# Patient Record
Sex: MALE | Race: WHITE
[De-identification: names, ages, dates, MRNs, and addresses within clinical notes are randomized per-mention and may not be internally consistent; named-entity substitution may affect disease eponyms.]

---

## 2019-12-04 ENCOUNTER — HOSPITAL ENCOUNTER (OUTPATIENT)
Dept: HOSPITAL 76 - DI.S | Age: 69
Discharge: HOME | End: 2019-12-04
Attending: NURSE PRACTITIONER
Payer: MEDICARE

## 2019-12-04 DIAGNOSIS — M25.461: ICD-10-CM

## 2019-12-04 DIAGNOSIS — M19.011: ICD-10-CM

## 2019-12-04 DIAGNOSIS — M19.012: ICD-10-CM

## 2019-12-04 DIAGNOSIS — M25.861: Primary | ICD-10-CM

## 2019-12-05 NOTE — XRAY REPORT
Reason:  PAIN IN SHOULDER AND RIGHT KNEE

Procedure Date:  12/04/2019   

Accession Number:  [id number] / [id number]                    

Procedure:  XRS - Shoulder 3 View BILAT CPT Code:  

 

***Final Report***

 

 

FULL RESULT:

 

 

Bilateral Shoulder Radiography

 

EXAM DATE: 12/4/2019 09:04 AM.

 

CLINICAL HISTORY: Right shoulder pain.

 

COMPARISON: None.

 

TECHNIQUE: 3 views each.

 

FINDINGS:

Right:

Bones: Bones are osteopenic. No fractures or bone lesions.

 

Joints: Minor degenerative spurring is seen in the glenohumeral and 

acromioclavicular joints. Mild subchondral cystic changes present. Normal 

alignment.

 

Soft tissues: The visualized hemithorax is unremarkable. No soft tissue 

swelling.

 

Left:

Bones: Bones are osteopenic. No fractures or bone lesions.

 

Joints: Minor degenerative spurring is seen in the glenohumeral and 

acromioclavicular joints. Normal alignment.

 

Soft tissues: The visualized hemithorax is unremarkable. No soft tissue 

swelling.

IMPRESSION: Minor bilateral shoulder DJD. No acute findings.

 

RADIA

## 2019-12-05 NOTE — XRAY REPORT
Reason:  SHOULDER PAIN BILATERAL, PAIN IN RIGHT KNEE

Procedure Date:  12/04/2019   

Accession Number:  [id number] / [id number]                    

Procedure:  XRS - Knee 3 View RT CPT Code:  

 

***Final Report***

 

 

FULL RESULT:

 

 

EXAM:

RIGHT KNEE RADIOGRAPHY

 

EXAM DATE: 12/4/2019 09:03 AM.

 

CLINICAL HISTORY: SHOULDER PAIN BILATERAL, PAIN IN RIGHT KNEE.

 

COMPARISON: None.

 

TECHNIQUE: 3 views.

 

FINDINGS:

Bones: Normal. No fractures or bone lesions.

 

Joints: Moderate sized joint effusion. Amorphous calcifications in medial 

and lateral compartments.

 

Soft Tissues: Normal. No soft tissue swelling.

IMPRESSION:

1. Moderate-sized joint effusion.

2. Amorphous calcifications in the medial and lateral compartments 

consistent with chondrocalcinosis.

 

[facility]IA

## 2020-02-10 ENCOUNTER — HOSPITAL ENCOUNTER (OUTPATIENT)
Dept: HOSPITAL 76 - DI.S | Age: 70
Discharge: HOME | End: 2020-02-10
Attending: NURSE PRACTITIONER
Payer: MEDICARE

## 2020-02-10 DIAGNOSIS — R91.8: Primary | ICD-10-CM

## 2020-02-10 PROCEDURE — 71046 X-RAY EXAM CHEST 2 VIEWS: CPT

## 2020-02-11 NOTE — XRAY REPORT
Reason:  DYSPNEA, UNSPECIFIED

Procedure Date:  02/10/2020   

Accession Number:  [id number] / [id number]                    

Procedure:  XRS - Chest 2 View X-Ray CPT Code:  46039

 

***Final Report***

 

 

FULL RESULT:

 

 

EXAM:

CHEST RADIOGRAPHY

 

EXAM DATE: 2/10/2020 10:26 AM.

 

CLINICAL HISTORY: Dyspnea, unspecified.

 

COMPARISON: None.

 

TECHNIQUE: 2 views.

 

FINDINGS:

 

Lungs/Pleura: Hyperinflation with flattening of the diaphragm. Area of 

focal infiltrate in the medial right lung base. Bilateral alveolar 

opacification in the lower lung fields, nonspecific, but can be seen in 

pneumonia, including atypical pneumonia. Upper lungs appear clear.

 

Mediastinum: Heart and mediastinal contours are unremarkable.

 

Other: None.

IMPRESSION: Right lung base infiltrate and bibasilar alveolar 

opacification, as above.

 

RADIA

## 2020-10-05 ENCOUNTER — HOSPITAL ENCOUNTER (OUTPATIENT)
Dept: HOSPITAL 76 - DI.S | Age: 70
Discharge: HOME | End: 2020-10-05
Attending: NURSE PRACTITIONER
Payer: MEDICARE

## 2020-10-05 DIAGNOSIS — M25.562: Primary | ICD-10-CM

## 2020-10-05 NOTE — XRAY REPORT
PROCEDURE:  Knee 3 View LT

 

INDICATIONS:  PAIN IN LEFT KNEE

 

TECHNIQUE:  3 views of the left knee(s) were acquired.  

 

COMPARISON:  None.

 

FINDINGS:  

 

Bones:  No fractures or dislocations.  No suspicious bony lesions.  

 

Soft tissues:  No joint effusion.  No suspicious soft tissue calcifications are seen except within th
e medial border of the medial meniscus best appreciated on the frontal projection..  

 

IMPRESSION:  Meniscal calcifications of the medial compartment, no effusion or loose body seen. Mild 
narrowing of the medial compartment joint interspace is present, indicating mild osteoarthritis is th
e likely cause.

 

Reviewed by: Chandu Lambert MD on 10/5/2020 3:09 PM PDT

Approved by: Chandu Lambert MD on 10/5/2020 3:09 PM PDT

 

 

Station ID:  [id number]

## 2021-12-08 ENCOUNTER — HOSPITAL ENCOUNTER (EMERGENCY)
Dept: HOSPITAL 76 - ED | Age: 71
Discharge: HOME | End: 2021-12-08
Payer: MEDICARE

## 2021-12-08 VITALS — DIASTOLIC BLOOD PRESSURE: 71 MMHG | SYSTOLIC BLOOD PRESSURE: 140 MMHG

## 2021-12-08 DIAGNOSIS — N13.2: Primary | ICD-10-CM

## 2021-12-08 LAB
ALBUMIN DIAFP-MCNC: 4.8 G/DL (ref 3.2–5.5)
ALBUMIN/GLOB SERPL: 1.5 {RATIO} (ref 1–2.2)
ALP SERPL-CCNC: 58 IU/L (ref 42–121)
ALT SERPL W P-5'-P-CCNC: 20 IU/L (ref 10–60)
ANION GAP SERPL CALCULATED.4IONS-SCNC: 11 MMOL/L (ref 6–13)
AST SERPL W P-5'-P-CCNC: 24 IU/L (ref 10–42)
BASOPHILS NFR BLD AUTO: 0.1 10^3/UL (ref 0–0.1)
BASOPHILS NFR BLD AUTO: 0.3 %
BILIRUB BLD-MCNC: 2.3 MG/DL (ref 0.2–1)
BUN SERPL-MCNC: 29 MG/DL (ref 6–20)
CALCIUM UR-MCNC: 9.7 MG/DL (ref 8.5–10.3)
CHLORIDE SERPL-SCNC: 99 MMOL/L (ref 101–111)
CLARITY UR REFRACT.AUTO: (no result)
CO2 SERPL-SCNC: 26 MMOL/L (ref 21–32)
CREAT SERPLBLD-SCNC: 1.5 MG/DL (ref 0.6–1.2)
EOSINOPHIL # BLD AUTO: 0 10^3/UL (ref 0–0.7)
EOSINOPHIL NFR BLD AUTO: 0.1 %
ERYTHROCYTE [DISTWIDTH] IN BLOOD BY AUTOMATED COUNT: 12.7 % (ref 12–15)
GFRSERPLBLD MDRD-ARVRAT: 46 ML/MIN/{1.73_M2} (ref 89–?)
GLOBULIN SER-MCNC: 3.1 G/DL (ref 2.1–4.2)
GLUCOSE SERPL-MCNC: 124 MG/DL (ref 70–100)
GLUCOSE UR QL STRIP.AUTO: NEGATIVE MG/DL
HCT VFR BLD AUTO: 40.6 % (ref 42–52)
HGB UR QL STRIP: 14.2 G/DL (ref 14–18)
KETONES UR QL STRIP.AUTO: (no result) MG/DL
LIPASE SERPL-CCNC: 28 U/L (ref 22–51)
LYMPHOCYTES # SPEC AUTO: 1.7 10^3/UL (ref 1.5–3.5)
LYMPHOCYTES NFR BLD AUTO: 12 %
MCH RBC QN AUTO: 30.7 PG (ref 27–31)
MCHC RBC AUTO-ENTMCNC: 35 G/DL (ref 32–36)
MCV RBC AUTO: 87.9 FL (ref 80–94)
MONOCYTES # BLD AUTO: 0.9 10^3/UL (ref 0–1)
MONOCYTES NFR BLD AUTO: 6.5 %
NEUTROPHILS # BLD AUTO: 11.5 10^3/UL (ref 1.5–6.6)
NEUTROPHILS # SNV AUTO: 14.3 X10^3/UL (ref 4.8–10.8)
NEUTROPHILS NFR BLD AUTO: 80.4 %
NITRITE UR QL STRIP.AUTO: NEGATIVE
NRBC # BLD AUTO: 0 /100WBC
NRBC # BLD AUTO: 0 X10^3/UL
PDW BLD AUTO: 10.8 FL (ref 7.4–11.4)
PH UR STRIP.AUTO: 7.5 PH (ref 5–7.5)
PLATELET # BLD: 151 10^3/UL (ref 130–450)
POTASSIUM SERPL-SCNC: 3.8 MMOL/L (ref 3.5–5)
PROT SPEC-MCNC: 7.9 G/DL (ref 6.7–8.2)
PROT UR STRIP.AUTO-MCNC: (no result) MG/DL
RBC # UR STRIP.AUTO: (no result) /UL
RBC # URNS HPF: (no result) /HPF (ref 0–5)
RBC MAR: 4.62 10^6/UL (ref 4.7–6.1)
SODIUM SERPLBLD-SCNC: 136 MMOL/L (ref 135–145)
SP GR UR STRIP.AUTO: 1.02 (ref 1–1.03)
SQUAMOUS URNS QL MICRO: (no result)
UROBILINOGEN UR QL STRIP.AUTO: (no result) E.U./DL
UROBILINOGEN UR STRIP.AUTO-MCNC: NEGATIVE MG/DL
WBC # UR MANUAL: (no result) /HPF (ref 0–3)

## 2021-12-08 PROCEDURE — 36415 COLL VENOUS BLD VENIPUNCTURE: CPT

## 2021-12-08 PROCEDURE — 85025 COMPLETE CBC W/AUTO DIFF WBC: CPT

## 2021-12-08 PROCEDURE — 96375 TX/PRO/DX INJ NEW DRUG ADDON: CPT

## 2021-12-08 PROCEDURE — 99284 EMERGENCY DEPT VISIT MOD MDM: CPT

## 2021-12-08 PROCEDURE — 81001 URINALYSIS AUTO W/SCOPE: CPT

## 2021-12-08 PROCEDURE — 87086 URINE CULTURE/COLONY COUNT: CPT

## 2021-12-08 PROCEDURE — 81003 URINALYSIS AUTO W/O SCOPE: CPT

## 2021-12-08 PROCEDURE — 96374 THER/PROPH/DIAG INJ IV PUSH: CPT

## 2021-12-08 PROCEDURE — 83690 ASSAY OF LIPASE: CPT

## 2021-12-08 PROCEDURE — 80053 COMPREHEN METABOLIC PANEL: CPT

## 2021-12-08 PROCEDURE — 74177 CT ABD & PELVIS W/CONTRAST: CPT

## 2021-12-08 NOTE — CT REPORT
PROCEDURE:  Abdomen/Pelvis W

 

INDICATIONS:  Abdominal pain, acute, left flank/abd

 

CONTRAST:  IV CONTRAST: Optiray 320 ml: 100 PO CONTRAST: *NO PO CONTRAST 

 

TECHNIQUE:  

After the administration of intravenous contrast, 5 mm thick sections acquired from the diaphragms to
 the symphysis.  5 mm thick coronal and sagittal reformats were acquired.  For radiation dose reducti
on, the following was used:  automated exposure control, adjustment of mA and/or kV according to debora
ent size.  

 

COMPARISON:  None.

 

FINDINGS:  

Image quality:  Excellent.  

 

ABDOMEN:  

Lung bases: There is a small 2 to 3 mm subpleural nodule in the left lower lobe on series 4 image 7. 
A small region of indistinct round glass opacity within the medial right lower lobe on series 4 image
 6 may reflect a mild infectious or inflammatory process versus scarring. Heart size is normal.  

 

Solid organs:  Evaluation of the liver demonstrates no focal hepatic lesions. Gallbladder appears wit
hin normal limits without calcified gallstones. Biliary system is non dilated.  The spleen is normal 
in size. Pancreas enhances normally without peripancreatic fat stranding or fluid collections.  No ad
renal nodules.  

 

There is an obstructing stone measuring up to 7 mm within the proximal left ureter with associated mi
ld left hydroureteronephrosis. Mild perinephric and perirenal fat stranding also present. The mid to 
distal left ureter are nondistended. There are several hydronephrosis. Right ureter is nondistended.

 

Peritoneum and bowel:  Bowel loops demonstrate normal wall thickness and caliber. The appendix is nor
mal in appearance. There is colonic diverticulosis without acute diverticulitis. No free fluid or air
.  

 

Nodes and vessels:  No retroperitoneal or mesenteric adenopathy by size criteria.  Aorta and inferior
 vena cava are normal in size.  

 

Miscellaneous:  No ventral hernias.  

 

 

PELVIS:  

Genitourinary:  Bladder wall thickness is normal.  

 

Miscellaneous:  No inguinal hernias or adenopathy.  

 

Bones:  No suspicious bony lesions.  No vertebral body compression fractures.  

 

IMPRESSION:  

 

1. Obstructing proximal left ureteral stone with mild left hydroureteronephrosis.

 

2. Colonic diverticulosis without acute diverticulitis.

 

3. Small left lower lobe 2 to 3 mm pulmonary nodule. If patient is at high risk for malignancy, a fol
low-up CT may be performed in 12 months to demonstrate stability if clinically indicated.

 

Reviewed by: Oli Mike MD on 12/8/2021 8:17 AM [facility]

Approved by: Oli Mike MD on 12/8/2021 8:17 AM [facility]

 

 

Station ID:  [id number]

## 2021-12-08 NOTE — ED PHYSICIAN DOCUMENTATION
PD HPI ABD PAIN





- Stated complaint


Stated Complaint: LT SIDE PX





- Chief complaint


Chief Complaint: General





- History obtained from


History obtained from: Patient





- History of Present Illness


Timing - onset: How many days ago (2)


Timing - duration: Days (2)


Timing - details: Abrupt onset, Still present (has increased overnight, 

significantly painful now.)


Quality: Aching, Sharp, Pain


Location: LUQ


Radiation: Left flank


Improved by: No: Eating, Laying still, Meds (tried antacids. Did not try pain 

meds per se.)


Worsened by: No: Eating, Moving, Breathing


Associated symptoms: Nausea, Vomiting.  No: Fever, Diarrhea, Constipation (no BM

since Monday.)


Similar symptoms before: Has not had sx before


Recently seen: Not recently seen





Review of Systems


Constitutional: denies: Fever, Chills


Nose: denies: Rhinorrhea / runny nose, Congestion


Throat: denies: Sore throat


Respiratory: denies: Cough


GI: reports: Abdominal Pain (left side and flank), Nausea, Vomiting


: denies: Dysuria, Frequency


Skin: denies: Rash, Lesions


Musculoskeletal: reports: Back pain.  denies: Neck pain


Neurologic: denies: Generalized weakness, Near syncope





PD PAST MEDICAL HISTORY





- Past Medical History


Past Medical History: Yes


Cardiovascular: None


Respiratory: None


Neuro: None


Endocrine/Autoimmune: None


GI: None


: None


HEENT: None


Psych: None


Musculoskeletal: Gout


Derm: None





- Past Surgical History


Past Surgical History: No





- Present Medications


Home Medications: 


                                Ambulatory Orders











 Medication  Instructions  Recorded  Confirmed


 


Naproxen 500 mg PO BID 10 Days #20 tab 12/08/21 


 


Ondansetron Odt [Zofran] 4 mg TL Q6H PRN #10 tablet 12/08/21 


 


Tamsulosin [Flomax] 0.4 mg PO DAILY #5 cap 12/08/21 


 


oxyCODONE [Roxicodone] 5 mg PO Q4-6H PRN #20 tablet 12/08/21 














- Allergies


Allergies/Adverse Reactions: 


                                    Allergies











Allergy/AdvReac Type Severity Reaction Status Date / Time


 


No Known Drug Allergies Allergy   Verified 12/08/21 06:31














- Social History


Does the pt smoke?: No


Smoking Status: Never smoker


Does the pt drink ETOH?: Yes


Does the pt have substance abuse?: No





- Immunizations


Immunizations are current?: Yes





- POLST


Patient has POLST: No





PD ED PE NORMAL





- Vitals


Vital signs reviewed: Yes





- General


General: Alert and oriented X 3, Well developed/nourished, Other (appears in 

pain and moving around to try to get comfortable. )





- Neck


Neck: Supple, no meningeal sign, No adenopathy





- Cardiac


Cardiac: RRR, No murmur





- Respiratory


Respiratory: Clear bilaterally





- Abdomen


Abdomen: Normal bowel sounds, Soft, Non distended, No organomegaly, Other 

(slight tender left upper without guarding nor percussion tender. )





- Male 


Male : Deferred





- Rectal


Rectal: Deferred





- Back


Back: No spinal TTP, Other (significant left CVA tenderness to percussion. Not 

tender to light touch. No rash nor sores. )





- Derm


Derm: Normal color, Warm and dry, No rash





- Extremities


Extremities: No edema, No calf tenderness / cord





- Neuro


Neuro: Alert and oriented X 3, No motor deficit, Normal speech





Results





- Vitals


Vitals: 


                               Vital Signs - 24 hr











  12/08/21 12/08/21





  06:25 09:27


 


Temperature 36.9 C 37.2 C


 


Heart Rate 59 L 64


 


Respiratory 16 16





Rate  


 


Blood Pressure 165/70 H 140/71 H


 


O2 Saturation 100 98








                                     Oxygen











O2 Source                      Room air

















- Labs


Labs: 


                                Laboratory Tests











  12/08/21 12/08/21 12/08/21





  06:20 07:26 07:26


 


WBC   14.3 H 


 


RBC   4.62 L 


 


Hgb   14.2 


 


Hct   40.6 L 


 


MCV   87.9 


 


MCH   30.7 


 


MCHC   35.0 


 


RDW   12.7 


 


Plt Count   151 


 


MPV   10.8 


 


Neut # (Auto)   11.5 H 


 


Lymph # (Auto)   1.7 


 


Mono # (Auto)   0.9 


 


Eos # (Auto)   0.0 


 


Baso # (Auto)   0.1 


 


Absolute Nucleated RBC   0.00 


 


Nucleated RBC %   0.0 


 


Sodium    136


 


Potassium    3.8


 


Chloride    99 L


 


Carbon Dioxide    26


 


Anion Gap    11.0


 


BUN    29 H


 


Creatinine    1.5 H


 


Estimated GFR (MDRD)    46 L


 


Glucose    124 H


 


Calcium    9.7


 


Total Bilirubin    2.3 H


 


AST    24


 


ALT    20


 


Alkaline Phosphatase    58


 


Total Protein    7.9


 


Albumin    4.8


 


Globulin    3.1


 


Albumin/Globulin Ratio    1.5


 


Lipase    28


 


Urine Color  YELLOW  


 


Urine Clarity  CLOUDY  


 


Urine pH  7.5  


 


Ur Specific Gravity  1.020  


 


Urine Protein  TRACE  


 


Urine Glucose (UA)  NEGATIVE  


 


Urine Ketones  TRACE  


 


Urine Occult Blood  LARGE H  


 


Urine Nitrite  NEGATIVE  


 


Urine Bilirubin  NEGATIVE  


 


Urine Urobilinogen  0.2 (NORMAL)  


 


Ur Leukocyte Esterase  NEGATIVE  


 


Urine RBC  6-10 H  


 


Urine WBC  0-3  


 


Ur Squamous Epith Cells  RARE Squamous  


 


Amorphous Sediment  Marked  


 


Urine Bacteria  Rare  


 


Ur Microscopic Review  INDICATED  


 


Urine Culture Comments  NOT INDICATED  














- Rads (name of study)


  ** abd/pelvic CT


Radiology: Prelim report reviewed, EMP read contemporaneously (5x7 mm ureteral 

stone in proximal third of left ureteral with mild hydronephrosis.), See rad 

report





PD MEDICAL DECISION MAKING





- ED course


Complexity details: reviewed results, re-evaluated patient (much improved pain 

level with IV meds. ), considered differential (flank pain and some blood in 

urine. Consider kidney stone, but also consider diverticular, vascular such as 

aneurysm, splenic infarct.), d/w patient





Departure





- Departure


Disposition: 01 Home, Self Care


Clinical Impression: 


 Ureterolithiasis, Flank pain, acute





Condition: Stable


Record reviewed to determine appropriate education?: Yes


Instructions:  ED Stone Renal W Colic


Follow-Up: 


Gonsalo Patricio MD [Primary Care Provider] - 


Prescriptions: 


Tamsulosin [Flomax] 0.4 mg PO DAILY #5 cap


Naproxen 500 mg PO BID 10 Days #20 tab


oxyCODONE [Roxicodone] 5 mg PO Q4-6H PRN #20 tablet


 PRN Reason: Pain


Ondansetron Odt [Zofran] 4 mg TL Q6H PRN #10 tablet


 PRN Reason: Nausea / Vomiting


Comments: 





I transmitted your prescriptions to [facility] in [place].





Dehydrated but there is no reason to over hydrate per se.  You do have a 5 to 7 

mm stone in the left ureter (kidney stone).  This is of a passable size and the 

majority will pass over several days to week.





Follow-up with your primary care Dr. Patricio or [place] referral line for referral

 to urologist if you do not seem to be improving over the next several days to 

week.





We will try to keep the symptoms tolerable with anti-inflammatory naproxen twice

 daily with food.  Add Tylenol 650 mg 3 times a day regularly for the next 

several days as well.  To that add oxycodone every 4-6 hours if needed for worse

 pain.





Tamsulosin daily to reduce ureteral spasms and help promote passage of the stone

.





Recheck if not improved well over the next several days and return to the ER if 

symptoms are uncontrolled with the above medications.





My narcotic instructions I am prescribing a short course of narcotic pain 

medication for you.  These are potentially dangerous and addictive medications 

that should be used carefully.


These medications may constipate you.  Take an over-the-counter stool softener 

such as docusate twice daily with plenty of water while taking these 

medications.  If you go 24 hours without a bowel movement, take over-the-counter

 MiraLAX, per package instructions.


Do not drink or drive while taking these medications.


If you received narcotic or sedating medications while in the emergency 

department do not drive for 24 hours.  Store this medication in a safe, secure 

place and out of reach of children.


It is a violation of federal law to give or sell this medication to another 

person or to use in a manner other than prescribed.


The ED will not refill narcotic prescriptions, including prescriptions lost or 

stolen.  You can dispose of unwanted medications at the [facility]'s office 

or at several pharmacies such as [facility].


Discharge Date/Time: 12/08/21 10:19

## 2022-01-12 ENCOUNTER — HOSPITAL ENCOUNTER (OUTPATIENT)
Dept: HOSPITAL 76 - SDS | Age: 72
Discharge: HOME | End: 2022-01-12
Attending: SURGERY
Payer: MEDICARE

## 2022-01-12 VITALS — DIASTOLIC BLOOD PRESSURE: 60 MMHG | SYSTOLIC BLOOD PRESSURE: 122 MMHG

## 2022-01-12 DIAGNOSIS — Z12.11: Primary | ICD-10-CM

## 2022-01-12 DIAGNOSIS — K64.8: ICD-10-CM

## 2022-01-12 NOTE — ANESTHESIA
Pre-Anesthesia VS, & Labs





- Diagnosis





screening exam





- Procedure





colonoscopy


Vital Signs: 





                                        











Temp Pulse Resp BP Pulse Ox


 


 36.4 C L  60   22   143/68 H  99 


 


 01/12/22 08:44  01/12/22 08:44  01/12/22 08:44  01/12/22 08:44  01/12/22 08:44











Height: 5 ft 10 in


Weight (kg): 72 kg


Body Mass Index: 22.7


BMI Classification: Healthy weight





- NPO


>8 hours





Home Medications and Allergies





none


Allergies/Adverse Reactions: 


                                    Allergies











Allergy/AdvReac Type Severity Reaction Status Date / Time


 


No Known Drug Allergies Allergy   Verified 12/08/21 06:31














Anes History & Medical History





- Anesthetic History


Family history of Anesthesia Complications: Denies


Family history of Malignant Hyperthermia: Denies





- Medical History


Cardiovascular: reports: None


Pulmonary: reports: None, Other (snores)


Gastrointestinal: reports: None


Urinary: reports: Kidney stones


Neuro: reports: None


Musculoskeletal: reports: None


Endocrine/Autoimmune: reports: None


Blood Disorders: reports: None


Skin: reports: None


Smoking Status: Never smoker


Psychosocial: reports: Cannabis (CBD occassionally)


History of Cancer?: No





- Surgical History


General: reports: Colonoscopy





Exam


General: Alert, Oriented x3, Cooperative, No acute distress


Dental: WNL


Mouth Opening: 3 Fingerbreadth


Neck Mobility: Normal


Mallampati classification: III


Thyromental Distance: 4-6 cm


Mental/Cognitive Status: Alert/Oriented X3, Normal for patient





Plan


Anesthesia Type: General, Total IV


Consent for Procedure(s) Verified and Reviewed: Yes


Code Status: Attempt Resuscitation


ASA classification: 1-Healthy patient


Is this case an emergency?: No

## 2023-03-10 ENCOUNTER — HOSPITAL ENCOUNTER (OUTPATIENT)
Dept: HOSPITAL 76 - EMS | Age: 73
Discharge: ANOTHER HOSPITAL | End: 2023-03-10
Payer: MEDICARE

## 2023-03-10 DIAGNOSIS — R25.1: ICD-10-CM

## 2023-03-10 DIAGNOSIS — R11.2: ICD-10-CM

## 2023-03-10 DIAGNOSIS — M54.50: Primary | ICD-10-CM

## 2023-03-10 DIAGNOSIS — R10.32: ICD-10-CM

## 2023-03-10 DIAGNOSIS — R39.198: ICD-10-CM

## 2023-05-11 ENCOUNTER — HOSPITAL ENCOUNTER (OUTPATIENT)
Dept: HOSPITAL 76 - SC | Age: 73
Discharge: HOME | End: 2023-05-11
Attending: NURSE PRACTITIONER
Payer: MEDICARE

## 2023-05-11 VITALS — DIASTOLIC BLOOD PRESSURE: 64 MMHG | SYSTOLIC BLOOD PRESSURE: 118 MMHG

## 2023-05-11 DIAGNOSIS — R06.83: Primary | ICD-10-CM

## 2023-05-11 PROCEDURE — 99212 OFFICE O/P EST SF 10 MIN: CPT

## 2023-05-11 PROCEDURE — 99203 OFFICE O/P NEW LOW 30 MIN: CPT

## 2023-05-11 NOTE — SLEEP CARE CONSULTATION
Information from patient questionnaire entered by Margaret Nguyen.





I have reviewed and concur with the information entered by Margaret Nguyen. This 

document represents the service I personally performed and the decisions made by

me, Nerissa Falk ARNP.





History of Present Illness


Service Date and Time: 05/11/2023 0928


Reason for Visit: New patient


Chief Complaint: reports: Snoring


Date of Onset: years


Usual bedtime: 930-10PM


Time it takes to fall asleep: 10-15MIN


Snores at night: Yes


Observed to quit breathing while asleep: No


Sleeps alone due to snoring: Yes (since 2019)


Number of times waking at night: 1


Reasons for waking at night: reports: Bathroom.  denies: Choking, Snoring, 

Gasping for air


Toss, Turn, or Twitch while sleeping: No


Recalls having dreams: No (does know he has them but doesn't remember them)


Usually gets out of bed at: 6AM


Feels refreshed in the morning: Yes


Morning headache: No


Sleepy or fatigued during the day: Yes (no intentional napping)


Ever fallen asleep while driving: No


Takes day naps: Yes (only unintentional naps for about 20 minutes)


Dreams during day naps: No


Prior sleep studies: No


Additional HPI information: 





I had the pleasure of seeing GILBERTO RAMOS today regarding the possibility of him 

having a sleep disorder. His current complaint is snoring. He states he was at 

[place] with kidney stones and was told he may have sleep apnea. They told 

him that he snored but nothing else. He has always snored loudly and his wife 

will sleep in separate room since 2019. He states he had his nose broken and has

sinus drainage also affecting him. His wife has never noticed him stop breathing

at night. 





- Parasomnia Symptoms


Ever been unable to move upon waking from sleep: No


Walks in sleep: No


Talks in sleep: No


Ever acted out dreams in sleep: No


Ever felt weak in the knees when startled or emotional: No


Bothered by creepy, crawly, restless sensations in legs: No


Problems with memory or concentration: No





Subjective


Initial [place] Sleepiness Scale score: 2 (05/11/23)





Past Medical History


Past Medical History: reports: Other (KIDNEY STONE REMOVED EARLIER THIS MONTH)





Social History


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 Patient is  and lives in [place]. 





Have you smoked in the past 12 months: No


Alcohol use: Yes


Alcohol amount and frequency: 1-2 MAYBE ONCE A MONTH


Caffeine use: Yes


Caffeine amount and frequency: 1 CUP MORNING TIME





Family History


Family history of sleep disordered breathing: No





Allergies and Home Medications


Known drug allergies: No


Drug allergies reviewed: Yes


Home medication list reviewed: Yes


Allergy and home medication list: 


Allergies





No Known Drug Allergies Allergy (Verified 05/10/23 08:59)





Medications:


Glucosamine


Turmeric 


concentrated cherry juice prn pseudogout


OTC generic Flonase for allergies





Review of Systems


Weight gain over past 5 years: 3-4


Weight loss over past 5 years: 3-4


Cardiovascular: denies: high blood pressure


Gastrointestinal: denies: heartburn


Neurological: denies: headaches


Ear/Nose/Throat: reports: nasal congestion, sinus problems, injury to nose 

(broken), wisdom teeth removed.  denies: tonsillectomy


Endocrine: denies: thyroid disease


Musculoskeletal: reports: other (FROZEN SHOULDER)


Immunologic: reports: allergies to food or environment





Physical Exam


Vital signs obtained and entered by: MARGARET HERNANDEZ MA


Blood Pressure: 118/64 (LEFT ARM)


Cuff size: regular


Heart Rate: 54


O2 Saturation: 99


Height: 5 ft 10 in


Weight: 154 lb 6.4 oz


Body Mass Index: 22.1


BMI Classification: Normal


Neck circumference: 15.5


Mouth and throat: narrow oropharynx


Soft palate: long


Hard palate: normal


Uvula: normal


Uvula visualization: 25% Mallampati Class III


Tonsils: small


Neck: normal w/o lymphadenopathy or thyromegaly


Heart: regular rate and rhythm


Lungs: clear bilaterally





Impression and Plan





1. Suspected Obstructive Sleep Apnea-Hypopnea Syndrome, as suggested by a 

history of loud and irregular snoring. Narrow oropharynx and obesity are common 

predisposing factors for obstructive sleep apnea-hypopnea syndrome. I recommend 

proceeding to polysomnography to confirm the diagnosis and to assess severity. 

If the patient has significant sleep disordered breathing, a manual CPAP 

titration study will also be performed to find the optimal treatment pressure. I

informed the patient of what the sleep studies involve and after some 

discussion, obtained agreement to proceed. The pathophysiology of obstructive 

sleep apnea-hypopnea syndrome was discussed with the patient and health risks of

cardiovascular and cerebrovascular disease if not treated.  Risks of drowsy 

driving discussed in detail and patient advised to avoid long distance driving 

and to pull over at the first sign of drowsiness. Patient agreed to plan. 





* Schedule polysomnography +- manual CPAP titration study and return in 1-2 

  weeks after the study to discuss result and initiate therapy.


* Avoid long distance driving or driving when feeling sleepy.


* Avoid alcohol, sedative and muscle relaxant around bedtime.


* Review instructions provided by trained office staff on how to prepare for the

  sleep study.


* Return for follow-up after sleep study completed.








Visit Type: In Office


Time Spent with Patient (minutes): 31


Provider Statement: I spent 100% of the Face to Face Visit with the patient with

greater than 50% spent counseling the patient and coordination of care.

## 2023-05-11 NOTE — SLEEP PATIENT INSTRUCTIONS
Sleep Center Visit Summary





- Patient Visit Information


Reason for Visit: 





Initial consult for evaluation of sleep disordered breathing and other sleep 

issues.





- Patient Instructions


Instructions Attached:  Sleep Clinic Visit, Sleep Study


Additional Instructions: 





You will be completing a sleep study, either an in-lab polysomnography (PSG) or 

home sleep study (HST).  You will follow-up in the sleep care office after the 

sleep study is completed to hear the results and talk about therapy, if needed. 







You will be called by our office staff to schedule this appointment, but you may

contact us with any questions.








- Clinic Information


Contact: 





[facility]


[address] [place] [address]


[email or web address]


T: [phone]

## 2023-05-19 ENCOUNTER — HOSPITAL ENCOUNTER (OUTPATIENT)
Dept: HOSPITAL 76 - SC | Age: 73
Discharge: HOME | End: 2023-05-19
Attending: NURSE PRACTITIONER
Payer: MEDICARE

## 2023-05-19 DIAGNOSIS — G47.61: Primary | ICD-10-CM

## 2023-05-19 PROCEDURE — 95810 POLYSOM 6/> YRS 4/> PARAM: CPT

## 2023-05-26 ENCOUNTER — HOSPITAL ENCOUNTER (OUTPATIENT)
Dept: HOSPITAL 76 - SC | Age: 73
Discharge: HOME | End: 2023-05-26
Attending: NURSE PRACTITIONER
Payer: MEDICARE

## 2023-05-26 DIAGNOSIS — R06.83: Primary | ICD-10-CM

## 2023-05-26 DIAGNOSIS — G47.61: ICD-10-CM

## 2023-05-26 NOTE — SLEEP CARE CONSULTATION
Information from patient questionnaire entered by Angela Nguyen.





I have reviewed and concur with the information entered by Angela Nguyen. This 

document represents the service I personally performed and the decisions made by

me, Nerissa Falk ARNP.





History of Present Illness


Service Date and Time: 05/26/2023    1040


Initial [place] Sleepiness Scale score: 2 (05/11/23)


Current [place] Sleepiness Scale score: 3 (05/26/23)


Additional HPI information: 





GILBERTO RAMOS returns via video telehealth visit for follow up and results of the 

recently performed polysomnography.





The patient was informed of the following findings: No significant sleep 

disordered breathing with an average AHI of 1.8 and kilo oxygen saturation of 

91%.  Elevated supine AHI at 6.0 and moderate PLM's not contributing to sleep 

fragmentation.





I explained the pathophysiology behind obstructive sleep apnea. Patient does not

have sleep apnea and was advised how weight gain could increase the risk of 

developing sleep apnea in the future. Patient does not have significant sleep 

disordered breathing but has elevated AHI in supine position so advised 

positional therapy. Methods to achieve positional management therapy were 

discussed; such as, positioning with pillows, wearing a T-shirt with tennis 

balls sewn into the back or commercially available products.





Patient has light to loud snoring. Snoring can be reduced by weight loss. Weight

loss is best achieved with diet consult. Patient instructed to contact PCP for 

referral. Snoring can also be treated with an oral appliance from a dentist. 

Advised to check insurance coverage. In addition, an ENT evaluation can be do to

see if other treatment is indicated. Patient does not drink alcohol.  Patient 

was cautioned about risks of drowsy driving until sleepiness symptoms resolve. 

Patient denies drowsy driving. 





Sleep Study





- Results


Type of Sleep Study: Polysomnography (COMPLETED 05/19/23)


Prior sleep studies: No


Polysomnography/Home Sleep Study results: 





IMPRESSION: The quality of the study is good. The patient had normal sleep 

efficiency. The sleep architecture


was relatively normal considering the first-night effect. Respiratory monitoring

showed no significant sleep


disordered breathing (AHI = 1.8) or hypoxia (kilo oxygen saturation of 91%). 

The few respiratory events occurred


almost exclusively during supine sleep (supine AHI = 6.0; non-supine = 1.47). 

Snore was light to loud in intensity. 


There was moderate periodic leg movement of sleep not associated with sleep 

fragmentation. Cardiac rhythm was


normal sinus rhythm without significant arrhythmia. No abnormal behavior 

(parasomnia) observed during the night.





Allergies and Home Medications


Known drug allergies: No


Drug allergies reviewed: Yes


Home medication list reviewed: Yes (no changes)


Allergy and home medication list: 


Allergies





No Known Drug Allergies Allergy (Verified 05/26/23 09:38)





Review of Systems


Review of systems same as previous: Yes (no changes)





Physical Exam


Vital signs obtained and entered by: ANGELA HERNANDEZ MA


Height: 5 ft 10 in (PER PT)


Weight: 152 lb (PER PT)


Body Mass Index: 21.8


BMI Classification: Normal





Impression and Plan





1. Snoring but no significant sleep disordered breathing. However, patient had 

an elevated supine AHI and is advised to avoid supine sleep. Patient advised 

that he can use a pillow positioning, continue sure tennis balls sewn in the 

back or other commercially available positioning belts to avoid supine sleep.  

Patient advised that often weight loss will reduce snoring as well as apnea 

risk. An oral appliance can also be used for snoring. This would require a 

dental consultation. Patient cautioned not to use other online appliances as can

cause bite issues. A list of accredited dentists in [facility] and one local dentist 

who makes oral appliances is available in office as needed. Patient is advised 

to check if insurance will cover. An ENT consult can also be helpful to 

determine if any other treatment is an option.





2. Periodic limb movement, moderate, that did not fragment patients sleep. 

Periodic limb movement of sleep (PLMS) is characterized by episodes of 

repetitive limb movements that occur during sleep and usually involve the lower 

limbs. The etiology is unknown. Caffeine can aggravate PLMS and should be 

avoided. Sleep hygiene methods can also improve sleep as well as lifestyle 

changes such as regular exercise. Patient was advised that no treatment is 

needed at this time. If symptoms increase, then further evaluation is indicated.





* Avoid supine sleep


* Avoid alcohol consumption near bedtime


* The patient is cautioned about driving until sleepiness is completely 

  resolved.


* Return as needed for follow up. 








Counseling Topics: Sleeping position


Visit Type: Telehealth Video


Video Type: Doximity


Patient Location: Home


Location of Provider: Office


Patient agrees and consents to this telehealth visit type: Yes


Patient agrees to have their insurance billed: Yes


Time Spent with Patient (minutes): 12


Provider Statement: I spent 100% of the Telehealth Video Call with the patient 

with greater than 50% spent counseling the patient and coordination of care.